# Patient Record
Sex: FEMALE | Race: WHITE | NOT HISPANIC OR LATINO | URBAN - METROPOLITAN AREA
[De-identification: names, ages, dates, MRNs, and addresses within clinical notes are randomized per-mention and may not be internally consistent; named-entity substitution may affect disease eponyms.]

---

## 2020-08-14 ENCOUNTER — EMERGENCY (EMERGENCY)
Facility: HOSPITAL | Age: 35
LOS: 1 days | Discharge: HOME | End: 2020-08-14
Admitting: EMERGENCY MEDICINE
Payer: SELF-PAY

## 2020-08-14 VITALS
SYSTOLIC BLOOD PRESSURE: 121 MMHG | TEMPERATURE: 98 F | DIASTOLIC BLOOD PRESSURE: 84 MMHG | WEIGHT: 130.07 LBS | RESPIRATION RATE: 18 BRPM | OXYGEN SATURATION: 99 % | HEART RATE: 78 BPM
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
## 2020-08-14 DIAGNOSIS — S80.812A ABRASION, LEFT LOWER LEG, INITIAL ENCOUNTER: ICD-10-CM

## 2020-08-14 DIAGNOSIS — S80.212A ABRASION, LEFT KNEE, INITIAL ENCOUNTER: ICD-10-CM
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
## 2020-08-14 DIAGNOSIS — M25.562 PAIN IN LEFT KNEE: ICD-10-CM
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
## 2020-08-14 DIAGNOSIS — S80.811A ABRASION, RIGHT LOWER LEG, INITIAL ENCOUNTER: ICD-10-CM

## 2020-08-14 PROCEDURE — 99284 EMERGENCY DEPT VISIT MOD MDM: CPT | Mod: 25

## 2020-08-14 PROCEDURE — 73630 X-RAY EXAM OF FOOT: CPT | Mod: 26,LT

## 2020-08-14 PROCEDURE — 73562 X-RAY EXAM OF KNEE 3: CPT | Mod: 26,LT

## 2020-08-14 NOTE — ED PROVIDER NOTE - LOCATION
Fever greater than (need to indicate Fahrenheit or Celsius)/Pain not relieved by Medications/Bleeding that does not stop/Nausea and vomiting that does not stop
knee
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
## 2020-08-14 NOTE — ED ADULT NURSE NOTE - CHPI ED NUR SYMPTOMS NEG
no chills/no decreased eating/drinking/no nausea/no tingling/no weakness/no vomiting/no dizziness/no fever/no pain

## 2020-08-14 NOTE — ED PROVIDER NOTE - DIAGNOSTIC INTERPRETATION
Radiology Interpretation performed by BOB Gill   L Knee x-ray, 3 views  L foot x-ray, AP + Lateral +Oblique   No fracture, no dislocation (joint spaces grossly normal), no Foreign Body noted, soft tissue normal
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
## 2020-08-14 NOTE — ED PROVIDER NOTE - PATIENT PORTAL LINK FT
You can access the FollowMyHealth Patient Portal offered by [facility] by registering at the following website: [email or web address]. By joining [facility]’s FollowMyHealth portal, you will also be able to view your health information using other applications (apps) compatible with our system.
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
## 2020-08-14 NOTE — ED PROVIDER NOTE - SKIN, MLM
Abrasions on both legs. Large abrasion on left leg right below knee cap. Small abrasion on all upper extremities and feet. Avulsion on right LE under patella, 3 x 3.

## 2024-03-20 PROBLEM — Z78.9 OTHER SPECIFIED HEALTH STATUS: Chronic | Status: ACTIVE | Noted: 2020-08-19

## 2024-03-21 PROBLEM — Z00.00 ENCOUNTER FOR PREVENTIVE HEALTH EXAMINATION: Status: ACTIVE | Noted: 2024-03-21

## 2024-03-27 ENCOUNTER — APPOINTMENT (OUTPATIENT)
Dept: ORTHOPEDIC SURGERY | Facility: CLINIC | Age: 39
End: 2024-03-27
Payer: SELF-PAY

## 2024-03-27 VITALS — HEIGHT: 66.54 IN | BODY MASS INDEX: 18.9 KG/M2 | WEIGHT: 119 LBS

## 2024-03-27 DIAGNOSIS — M75.81 OTHER SHOULDER LESIONS, RIGHT SHOULDER: ICD-10-CM

## 2024-03-27 DIAGNOSIS — M75.41 IMPINGEMENT SYNDROME OF RIGHT SHOULDER: ICD-10-CM

## 2024-03-27 PROCEDURE — 73030 X-RAY EXAM OF SHOULDER: CPT | Mod: RT

## 2024-03-27 PROCEDURE — 99203 OFFICE O/P NEW LOW 30 MIN: CPT

## 2024-03-27 RX ORDER — DICLOFENAC SODIUM 75 MG/1
75 TABLET, DELAYED RELEASE ORAL TWICE DAILY
Qty: 60 | Refills: 4 | Status: ACTIVE | COMMUNITY
Start: 2024-03-27 | End: 1900-01-01
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
## 2024-03-27 NOTE — DISCUSSION/SUMMARY
[de-identified] : DICLOFENAC 2 X DAY 2-3 WEEKS HOME EXERCISES DAILY PT 2 X 4 WEEKS   CONSIDER KENALOG INJECTION   MRI IF NO RELIEF AFTER 12 WEEK OF TREATMENT

## 2024-03-27 NOTE — PHYSICAL EXAM
[de-identified] : PHYSICAL EXAM  RIGHT  SHOULDER  NECK EXAM  FROM NONTENDER  SPURLING  RIGHT=NEG, LEFT=NEG  NORMAL POSTURE  AROM 140 / 140 / 90 / 30 TENDER: SA REGIONANTERIOR   SPECIAL TESTING : JOSÉ - POSITIVE  TED - POSITIVE  SPEED TEST - POSITIVE  MCKEON - NEGATIVE  APPREHENSION AND SUPPRESSION - NEGATIVE   RC STRENGTH TESTING  SS:  5/5 SUB 5/5 IS     5/5 BICEPS  5/5  SENSATION  - GROSSLY INTACT    [de-identified] : RIGHT SHOULDER XRAY (2 VIEWS - AP AND OUTLET) -   NO OBVIOUS FRACTURE ,  SEPARATION OR DISLOCATION  NO SIGNIFICANT OSTEOARTHRITIS, TYPE 1B ACROMION  CSA= 37.8

## 2024-03-29 ENCOUNTER — APPOINTMENT (OUTPATIENT)
Dept: ORTHOPEDIC SURGERY | Facility: CLINIC | Age: 39
End: 2024-03-29